# Patient Record
Sex: MALE | Race: WHITE | NOT HISPANIC OR LATINO
[De-identification: names, ages, dates, MRNs, and addresses within clinical notes are randomized per-mention and may not be internally consistent; named-entity substitution may affect disease eponyms.]

---

## 2017-09-27 ENCOUNTER — RESULT REVIEW (OUTPATIENT)
Age: 52
End: 2017-09-27

## 2018-08-28 ENCOUNTER — OUTPATIENT (OUTPATIENT)
Dept: OUTPATIENT SERVICES | Facility: HOSPITAL | Age: 53
LOS: 1 days | Discharge: HOME | End: 2018-08-28
Payer: COMMERCIAL

## 2018-08-28 VITALS
TEMPERATURE: 98 F | OXYGEN SATURATION: 98 % | RESPIRATION RATE: 14 BRPM | HEIGHT: 69.5 IN | HEART RATE: 88 BPM | WEIGHT: 207.01 LBS

## 2018-08-28 DIAGNOSIS — Z98.890 OTHER SPECIFIED POSTPROCEDURAL STATES: Chronic | ICD-10-CM

## 2018-08-28 DIAGNOSIS — K64.8 OTHER HEMORRHOIDS: ICD-10-CM

## 2018-08-28 DIAGNOSIS — K21.9 GASTRO-ESOPHAGEAL REFLUX DISEASE WITHOUT ESOPHAGITIS: ICD-10-CM

## 2018-08-28 DIAGNOSIS — I10 ESSENTIAL (PRIMARY) HYPERTENSION: ICD-10-CM

## 2018-08-28 DIAGNOSIS — Z01.818 ENCOUNTER FOR OTHER PREPROCEDURAL EXAMINATION: ICD-10-CM

## 2018-08-28 DIAGNOSIS — E78.00 PURE HYPERCHOLESTEROLEMIA, UNSPECIFIED: ICD-10-CM

## 2018-08-28 DIAGNOSIS — Z90.89 ACQUIRED ABSENCE OF OTHER ORGANS: Chronic | ICD-10-CM

## 2018-08-28 DIAGNOSIS — G47.33 OBSTRUCTIVE SLEEP APNEA (ADULT) (PEDIATRIC): ICD-10-CM

## 2018-08-28 DIAGNOSIS — F41.9 ANXIETY DISORDER, UNSPECIFIED: ICD-10-CM

## 2018-08-28 DIAGNOSIS — K62.5 HEMORRHAGE OF ANUS AND RECTUM: ICD-10-CM

## 2018-08-28 LAB
ANION GAP SERPL CALC-SCNC: 10 MMOL/L — SIGNIFICANT CHANGE UP (ref 5–17)
BASOPHILS # BLD AUTO: 0.07 K/UL — SIGNIFICANT CHANGE UP (ref 0–0.2)
BASOPHILS NFR BLD AUTO: 1 % — SIGNIFICANT CHANGE UP (ref 0–2)
BUN SERPL-MCNC: 20 MG/DL — SIGNIFICANT CHANGE UP (ref 7–23)
CALCIUM SERPL-MCNC: 8.8 MG/DL — SIGNIFICANT CHANGE UP (ref 8.5–10.1)
CHLORIDE SERPL-SCNC: 105 MMOL/L — SIGNIFICANT CHANGE UP (ref 96–108)
CO2 SERPL-SCNC: 26 MMOL/L — SIGNIFICANT CHANGE UP (ref 22–31)
CREAT SERPL-MCNC: 0.93 MG/DL — SIGNIFICANT CHANGE UP (ref 0.5–1.3)
EOSINOPHIL # BLD AUTO: 0.11 K/UL — SIGNIFICANT CHANGE UP (ref 0–0.5)
EOSINOPHIL NFR BLD AUTO: 1.5 % — SIGNIFICANT CHANGE UP (ref 0–6)
GLUCOSE SERPL-MCNC: 98 MG/DL — SIGNIFICANT CHANGE UP (ref 70–99)
HCT VFR BLD CALC: 46 % — SIGNIFICANT CHANGE UP (ref 39–50)
HGB BLD-MCNC: 15.5 G/DL — SIGNIFICANT CHANGE UP (ref 13–17)
IMM GRANULOCYTES NFR BLD AUTO: 0.3 % — SIGNIFICANT CHANGE UP (ref 0–1.5)
LYMPHOCYTES # BLD AUTO: 2.69 K/UL — SIGNIFICANT CHANGE UP (ref 1–3.3)
LYMPHOCYTES # BLD AUTO: 37 % — SIGNIFICANT CHANGE UP (ref 13–44)
MCHC RBC-ENTMCNC: 26.4 PG — LOW (ref 27–34)
MCHC RBC-ENTMCNC: 33.7 GM/DL — SIGNIFICANT CHANGE UP (ref 32–36)
MCV RBC AUTO: 78.2 FL — LOW (ref 80–100)
MONOCYTES # BLD AUTO: 0.56 K/UL — SIGNIFICANT CHANGE UP (ref 0–0.9)
MONOCYTES NFR BLD AUTO: 7.7 % — SIGNIFICANT CHANGE UP (ref 2–14)
NEUTROPHILS # BLD AUTO: 3.82 K/UL — SIGNIFICANT CHANGE UP (ref 1.8–7.4)
NEUTROPHILS NFR BLD AUTO: 52.5 % — SIGNIFICANT CHANGE UP (ref 43–77)
PLATELET # BLD AUTO: 250 K/UL — SIGNIFICANT CHANGE UP (ref 150–400)
POTASSIUM SERPL-MCNC: 3.6 MMOL/L — SIGNIFICANT CHANGE UP (ref 3.5–5.3)
POTASSIUM SERPL-SCNC: 3.6 MMOL/L — SIGNIFICANT CHANGE UP (ref 3.5–5.3)
RBC # BLD: 5.88 M/UL — HIGH (ref 4.2–5.8)
RBC # FLD: 13 % — SIGNIFICANT CHANGE UP (ref 10.3–14.5)
SODIUM SERPL-SCNC: 141 MMOL/L — SIGNIFICANT CHANGE UP (ref 135–145)
WBC # BLD: 7.27 K/UL — SIGNIFICANT CHANGE UP (ref 3.8–10.5)
WBC # FLD AUTO: 7.27 K/UL — SIGNIFICANT CHANGE UP (ref 3.8–10.5)

## 2018-08-28 PROCEDURE — 93010 ELECTROCARDIOGRAM REPORT: CPT

## 2018-08-28 RX ORDER — SIMVASTATIN 20 MG/1
0 TABLET, FILM COATED ORAL
Qty: 0 | Refills: 0 | COMMUNITY

## 2018-08-28 NOTE — H&P PST ADULT - FAMILY HISTORY
Father  Still living? No  Family history of prostate cancer, Age at diagnosis: Age Unknown     Mother  Still living? Yes, Estimated age: 81-90  Family history of CHF (congestive heart failure), Age at diagnosis: Age Unknown

## 2018-08-28 NOTE — H&P PST ADULT - ASSESSMENT
yo scheduled for   with    on     1. Labs as per surgeon  2. EKG  3. Medical clearance with  4. discussed EZ sponges & day of procedure instructions 54 yo male scheduled for  internal hemorrhoidectomy on 9/7/18 with Dr. Abreu    1. Labs as per surgeon  2. EKG  3. Medical clearance with PCP Dr Rubio Wadsworth to be arranged  4. discussed NPO & day of procedure instructions  5. Instructed to bring CPAP machine on day of procedure, verbalized understanding

## 2018-08-28 NOTE — H&P PST ADULT - PMH
Anxiety disorder    Essential hypertension    GERD (gastroesophageal reflux disease)    Hemorrhoids    Pure hypercholesterolemia    Sleep apnea  uses CPAP machine

## 2018-08-28 NOTE — H&P PST ADULT - PSH
H/O hemorrhoidectomy  2013  H/O right inguinal hernia repair  as a child  History of esophagogastroduodenoscopy (EGD)  2017  S/P colonoscopy    S/P tonsillectomy  as a child

## 2018-08-28 NOTE — H&P PST ADULT - HISTORY OF PRESENT ILLNESS
54 yo male presents to PST. Reports bleeding hemorrhoids. Reports daily BMs. Denies abdominal, n/v, rectal pain or anemia.

## 2018-09-07 ENCOUNTER — RESULT REVIEW (OUTPATIENT)
Age: 53
End: 2018-09-07

## 2018-09-07 ENCOUNTER — OUTPATIENT (OUTPATIENT)
Dept: OUTPATIENT SERVICES | Facility: HOSPITAL | Age: 53
LOS: 1 days | Discharge: HOME | End: 2018-09-07
Payer: COMMERCIAL

## 2018-09-07 VITALS
RESPIRATION RATE: 16 BRPM | DIASTOLIC BLOOD PRESSURE: 85 MMHG | OXYGEN SATURATION: 98 % | TEMPERATURE: 98 F | HEIGHT: 70 IN | WEIGHT: 207.01 LBS | HEART RATE: 74 BPM | SYSTOLIC BLOOD PRESSURE: 145 MMHG

## 2018-09-07 VITALS
HEART RATE: 71 BPM | OXYGEN SATURATION: 97 % | RESPIRATION RATE: 16 BRPM | DIASTOLIC BLOOD PRESSURE: 73 MMHG | SYSTOLIC BLOOD PRESSURE: 146 MMHG

## 2018-09-07 DIAGNOSIS — Z90.89 ACQUIRED ABSENCE OF OTHER ORGANS: Chronic | ICD-10-CM

## 2018-09-07 DIAGNOSIS — Z98.890 OTHER SPECIFIED POSTPROCEDURAL STATES: Chronic | ICD-10-CM

## 2018-09-07 PROCEDURE — 88304 TISSUE EXAM BY PATHOLOGIST: CPT | Mod: 26

## 2018-09-07 RX ORDER — OXYCODONE HYDROCHLORIDE 5 MG/1
5 TABLET ORAL ONCE
Qty: 0 | Refills: 0 | Status: DISCONTINUED | OUTPATIENT
Start: 2018-09-07 | End: 2018-09-07

## 2018-09-07 RX ORDER — OMEGA-3 ACID ETHYL ESTERS 1 G
1 CAPSULE ORAL
Qty: 0 | Refills: 0 | COMMUNITY

## 2018-09-07 RX ORDER — SODIUM CHLORIDE 9 MG/ML
1000 INJECTION, SOLUTION INTRAVENOUS
Qty: 0 | Refills: 0 | Status: DISCONTINUED | OUTPATIENT
Start: 2018-09-07 | End: 2018-09-07

## 2018-09-07 RX ORDER — ATORVASTATIN CALCIUM 80 MG/1
1 TABLET, FILM COATED ORAL
Qty: 0 | Refills: 0 | COMMUNITY

## 2018-09-07 RX ORDER — OXYCODONE HYDROCHLORIDE 5 MG/1
10 TABLET ORAL ONCE
Qty: 0 | Refills: 0 | Status: DISCONTINUED | OUTPATIENT
Start: 2018-09-07 | End: 2018-09-07

## 2018-09-07 RX ORDER — OXYCODONE HYDROCHLORIDE 5 MG/1
2 TABLET ORAL
Qty: 40 | Refills: 0 | OUTPATIENT
Start: 2018-09-07 | End: 2018-09-11

## 2018-09-07 RX ORDER — MULTIVIT-MIN/FERROUS GLUCONATE 9 MG/15 ML
1 LIQUID (ML) ORAL
Qty: 0 | Refills: 0 | COMMUNITY

## 2018-09-07 RX ORDER — ONDANSETRON 8 MG/1
4 TABLET, FILM COATED ORAL ONCE
Qty: 0 | Refills: 0 | Status: DISCONTINUED | OUTPATIENT
Start: 2018-09-07 | End: 2018-09-07

## 2018-09-07 RX ORDER — FENTANYL CITRATE 50 UG/ML
50 INJECTION INTRAVENOUS
Qty: 0 | Refills: 0 | Status: DISCONTINUED | OUTPATIENT
Start: 2018-09-07 | End: 2018-09-07

## 2018-09-07 RX ORDER — OMEPRAZOLE 10 MG/1
1 CAPSULE, DELAYED RELEASE ORAL
Qty: 0 | Refills: 0 | COMMUNITY

## 2018-09-07 NOTE — ASU DISCHARGE PLAN (ADULT/PEDIATRIC). - NOTIFY
Bleeding that does not stop/Pain not relieved by Medications/Fever greater than 101/Unable to Urinate/Inability to Tolerate Liquids or Foods/Persistent Nausea and Vomiting

## 2018-09-07 NOTE — ASU DISCHARGE PLAN (ADULT/PEDIATRIC). - SPECIAL INSTRUCTIONS
If no BM by Sunday 10 am take 2 dulcolax pills by mouth.  If unable to urinate, soak in warm tub and urinate in the tub. Take Ibuprofen 600 mg three times a day with food.  If no BM by Sunday 10 am take 2 dulcolax pills by mouth.  If unable to urinate, soak in warm tub and urinate in the tub.

## 2018-09-07 NOTE — ASU DISCHARGE PLAN (ADULT/PEDIATRIC). - INSTRUCTIONS
Regular diet.  Take metamucil or citrucel fiber one serving twice a day.  Drink 48 oz of noncaffeinated liquid / day if voiding without difficulty.

## 2018-09-07 NOTE — BRIEF OPERATIVE NOTE - PROCEDURE
<<-----Click on this checkbox to enter Procedure Hemorrhoid excisions  09/07/2018    Active  [facility]  Hemorrhoidectomy by ligation  09/07/2018    Active  [facility]

## 2018-09-07 NOTE — ASU DISCHARGE PLAN (ADULT/PEDIATRIC). - NURSING INSTRUCTIONS
Minimize IVF. Minimize IVF.  Begin with liquids and light food ( tea, toast, Jello, soups). Advance to what you normally eat. Liquids should taken in adequate amounts today.

## 2018-09-11 DIAGNOSIS — E78.00 PURE HYPERCHOLESTEROLEMIA, UNSPECIFIED: ICD-10-CM

## 2018-09-11 DIAGNOSIS — K64.8 OTHER HEMORRHOIDS: ICD-10-CM

## 2018-09-11 DIAGNOSIS — K62.5 HEMORRHAGE OF ANUS AND RECTUM: ICD-10-CM

## 2018-09-11 DIAGNOSIS — F41.9 ANXIETY DISORDER, UNSPECIFIED: ICD-10-CM

## 2018-09-11 DIAGNOSIS — G47.33 OBSTRUCTIVE SLEEP APNEA (ADULT) (PEDIATRIC): ICD-10-CM

## 2018-09-11 DIAGNOSIS — K21.9 GASTRO-ESOPHAGEAL REFLUX DISEASE WITHOUT ESOPHAGITIS: ICD-10-CM

## 2018-09-11 DIAGNOSIS — F17.210 NICOTINE DEPENDENCE, CIGARETTES, UNCOMPLICATED: ICD-10-CM

## 2018-09-11 DIAGNOSIS — I10 ESSENTIAL (PRIMARY) HYPERTENSION: ICD-10-CM

## 2018-09-11 LAB — SURGICAL PATHOLOGY FINAL REPORT - CH: SIGNIFICANT CHANGE UP

## 2019-06-18 PROBLEM — Z00.00 ENCOUNTER FOR PREVENTIVE HEALTH EXAMINATION: Status: ACTIVE | Noted: 2019-06-18

## 2019-06-18 PROBLEM — F41.9 ANXIETY DISORDER, UNSPECIFIED: Chronic | Status: ACTIVE | Noted: 2018-08-28

## 2019-06-18 PROBLEM — K64.9 UNSPECIFIED HEMORRHOIDS: Chronic | Status: ACTIVE | Noted: 2018-08-28

## 2019-06-18 PROBLEM — E78.00 PURE HYPERCHOLESTEROLEMIA, UNSPECIFIED: Chronic | Status: ACTIVE | Noted: 2018-08-28

## 2019-06-18 PROBLEM — G47.30 SLEEP APNEA, UNSPECIFIED: Chronic | Status: ACTIVE | Noted: 2018-08-28

## 2019-06-18 PROBLEM — K21.9 GASTRO-ESOPHAGEAL REFLUX DISEASE WITHOUT ESOPHAGITIS: Chronic | Status: ACTIVE | Noted: 2018-08-28

## 2019-06-18 PROBLEM — I10 ESSENTIAL (PRIMARY) HYPERTENSION: Chronic | Status: ACTIVE | Noted: 2018-08-28

## 2019-06-19 ENCOUNTER — APPOINTMENT (OUTPATIENT)
Dept: ORTHOPEDIC SURGERY | Facility: CLINIC | Age: 54
End: 2019-06-19
Payer: COMMERCIAL

## 2019-06-19 VITALS
SYSTOLIC BLOOD PRESSURE: 136 MMHG | DIASTOLIC BLOOD PRESSURE: 81 MMHG | HEART RATE: 89 BPM | TEMPERATURE: 98 F | HEIGHT: 70 IN | WEIGHT: 205 LBS | BODY MASS INDEX: 29.35 KG/M2

## 2019-06-19 DIAGNOSIS — Z86.39 PERSONAL HISTORY OF OTHER ENDOCRINE, NUTRITIONAL AND METABOLIC DISEASE: ICD-10-CM

## 2019-06-19 DIAGNOSIS — Z80.9 FAMILY HISTORY OF MALIGNANT NEOPLASM, UNSPECIFIED: ICD-10-CM

## 2019-06-19 PROCEDURE — 99203 OFFICE O/P NEW LOW 30 MIN: CPT

## 2019-06-19 PROCEDURE — 73120 X-RAY EXAM OF HAND: CPT | Mod: LT

## 2019-06-27 NOTE — DISCUSSION/SUMMARY
[de-identified] : At this time, due to trigger finger of left thumb, I recommended splinting, ice and an anti-inflammatory.  He will be reassessed in two to three weeks for possible injection.

## 2019-06-27 NOTE — ADDENDUM
[FreeTextEntry1] : This note was written by Jojo Lee on 06/27/2019 acting as a scribe for PADMINI MARCELINO

## 2019-06-27 NOTE — HISTORY OF PRESENT ILLNESS
[3] : the ailment interference is 3/10 [(Does not interfere) 0] : the ailment interference is 0/10 (does not interfere) [de-identified] : The patient comes in today with complaints of pain to his left thumb. He states it has been going on since 5/1/19.  He noticed popping and catching.  This injury is not due to an automobile accident.  The patient states the pain is constant.  The patient describes the pain as sharp.  The patient notes nothing makes his symptoms better, while bending makes his symptoms worse.   The patient indicates pain is at a level of 5 on a pain scale of 0-10. [] : No

## 2019-06-27 NOTE — PHYSICAL EXAM
[Normal] : Gait: normal [de-identified] : Right Thumb:\par \par                                    					           Claimant:  	   Normal:  \par \par Thumb Interphalangeal Hyperextension/Flexion		                           15H/80             15H/80\par \par Thumb Metacarpophalangeal Hyperextension/Flexion	                           10/55	    10/55\par \par Finger DIP Joints Extension/Flexion				             0/80	     0/80\par \par Finger PIP Joints Extension/Flexion 				             0/100	     0/100\par \par Finger MCP Joints Hyperextension/Flexion 			      (0-45H)/90	     (0-45H)/90\par \par FDS, FDP intact.  No triggering.  No tenderness or swelling over the A1 pulley for each finger.  No instability to varus or valgus stress.  No motor or sensory deficits.   Less than two second capillary refill.  Skin is intact.  No rashes, scars or lesions.\par \par Left Thumb:\par                                                                                                 Claimant:  	 Normal:  \par \par Thumb Interphalangeal Hyperextension/Flexion		15H/80		15H/80\par \par Thumb Metacarpophalangeal Hyperextension/Flexion	10/55		10/55\par \par Finger DIP Joints Extension/Flexion		                 0/80		0/80\par \par Finger PIP Joints Extension/Flexion 			0/100		0/100\par \par Finger MCP Joints Hyperextension/Flexion 		(0-45H)/90	(0-45H)/90\par \par \par FDS, FDP intact.  Positive triggering.  Positive tenderness at the A1 pulley.  No instability to varus or valgus stress.  No gross neurologic or vascular deficits distally.  Less than two second capillary refill.  Skin is intact.  No rashes, scars or lesions. \par   [de-identified] : Appearance:  Well-developed, well-nourished male in no acute distress.\par \par   [de-identified] : Radiographs, two views of the left hand, show no obvious osseous abnormalities.

## 2019-07-12 ENCOUNTER — APPOINTMENT (OUTPATIENT)
Dept: ORTHOPEDIC SURGERY | Facility: CLINIC | Age: 54
End: 2019-07-12
Payer: COMMERCIAL

## 2019-07-12 VITALS
HEIGHT: 70 IN | WEIGHT: 205 LBS | TEMPERATURE: 98.2 F | SYSTOLIC BLOOD PRESSURE: 142 MMHG | HEART RATE: 99 BPM | DIASTOLIC BLOOD PRESSURE: 94 MMHG | BODY MASS INDEX: 29.35 KG/M2

## 2019-07-12 DIAGNOSIS — M65.312 TRIGGER THUMB, LEFT THUMB: ICD-10-CM

## 2019-07-12 PROCEDURE — 99212 OFFICE O/P EST SF 10 MIN: CPT

## 2019-07-16 NOTE — HISTORY OF PRESENT ILLNESS
[de-identified] : The patient comes in today status post a left thumb trigger finger.  He states he had an injection and he feels much better from the injection.  He states intermittently it will trigger, but otherwise he is 75% better.

## 2019-07-16 NOTE — ADDENDUM
[FreeTextEntry1] : This note was written by Angelina Villaseñor on 07/16/2019 acting as scribe for Kady Skelton, OTR/L, PA

## 2019-07-16 NOTE — PHYSICAL EXAM
[Normal] : Gait: normal [de-identified] : Left Thumb:\par       Claimant: 	 Normal: \par \par Thumb Interphalangeal Hyperextension/Flexion		15H/80		15H/80\par \par Thumb Metacarpophalangeal Hyperextension/Flexion	10/55		10/55\par \par Finger DIP Joints Extension/Flexion		  0/80		0/80\par \par Finger PIP Joints Extension/Flexion 			0/100		0/100\par \par Finger MCP Joints Hyperextension/Flexion 		(0-45H)/90	(0-45H)/90\par \par \par FDS, FDP intact. Positive triggering. Positive tenderness at the A1 pulley. No instability to varus or valgus stress. No gross neurologic or vascular deficits distally. Less than two second capillary refill. Skin is intact. No rashes, scars or lesions. \par  [de-identified] : Appearance:  Well-developed, well-nourished male in no acute distress.\par

## 2019-07-16 NOTE — DISCUSSION/SUMMARY
[de-identified] : The patient is status post trigger finger of the left thumb.  The patient is advised to return to the office on an as needed basis.\par \par The patient has been advised that their blood pressure today was outside normal ranges and the patient was instructed accordingly. Our Blood Pressure Monitoring Sheet with instructions was given to the patient.\par

## 2020-12-29 ENCOUNTER — APPOINTMENT (OUTPATIENT)
Dept: RADIOLOGY | Facility: CLINIC | Age: 55
End: 2020-12-29
Payer: COMMERCIAL

## 2020-12-29 ENCOUNTER — OUTPATIENT (OUTPATIENT)
Dept: OUTPATIENT SERVICES | Facility: HOSPITAL | Age: 55
LOS: 1 days | End: 2020-12-29
Payer: COMMERCIAL

## 2020-12-29 DIAGNOSIS — Z98.890 OTHER SPECIFIED POSTPROCEDURAL STATES: Chronic | ICD-10-CM

## 2020-12-29 DIAGNOSIS — Z00.00 ENCOUNTER FOR GENERAL ADULT MEDICAL EXAMINATION WITHOUT ABNORMAL FINDINGS: ICD-10-CM

## 2020-12-29 DIAGNOSIS — Z90.89 ACQUIRED ABSENCE OF OTHER ORGANS: Chronic | ICD-10-CM

## 2020-12-29 PROCEDURE — 73030 X-RAY EXAM OF SHOULDER: CPT

## 2020-12-29 PROCEDURE — 73620 X-RAY EXAM OF FOOT: CPT

## 2020-12-29 PROCEDURE — 73030 X-RAY EXAM OF SHOULDER: CPT | Mod: 26,LT

## 2020-12-29 PROCEDURE — 73620 X-RAY EXAM OF FOOT: CPT | Mod: 26,LT

## 2022-06-13 ENCOUNTER — APPOINTMENT (OUTPATIENT)
Dept: ORTHOPEDIC SURGERY | Facility: CLINIC | Age: 57
End: 2022-06-13
Payer: COMMERCIAL

## 2022-06-13 PROCEDURE — 20550 NJX 1 TENDON SHEATH/LIGAMENT: CPT | Mod: RT

## 2022-06-14 NOTE — DISCUSSION/SUMMARY
[de-identified] : At this time, due to trigger finger of the right thumb, I recommend ice and elevation. He will be reassessed in three to four weeks.

## 2022-06-14 NOTE — HISTORY OF PRESENT ILLNESS
[de-identified] : The patient comes in today with complaints of catching and popping of the right thumb.  He was seen in the past for a trigger finger on the left, which has fully resolved.

## 2022-06-14 NOTE — PHYSICAL EXAM
[de-identified] : Right Thumb:\par                                                                                                 Claimant:  	 Normal:  \par \par Thumb Interphalangeal Hyperextension/Flexion		15H/80		15H/80\par \par Thumb Metacarpophalangeal Hyperextension/Flexion	10/55		10/55\par \par Finger DIP Joints Extension/Flexion		                 0/80		0/80\par \par Finger PIP Joints Extension/Flexion 			0/100		0/100\par \par Finger MCP Joints Hyperextension/Flexion 		(0-45H)/90	(0-45H)/90\par \par \par FDS, FDP intact.  Positive triggering.  Positive tenderness at the A1 pulley.  No instability to varus or valgus stress.  No gross neurologic or vascular deficits distally.  Less than two second capillary refill.  Skin is intact.  No rashes, scars or lesions.\par  \par Left Thumb:\par \par                                    					           Claimant:  	   Normal:  \par \par Thumb Interphalangeal Hyperextension/Flexion		                           15H/80             15H/80\par \par Thumb Metacarpophalangeal Hyperextension/Flexion	                           10/55	    10/55\par \par Finger DIP Joints Extension/Flexion				             0/80	     0/80\par \par Finger PIP Joints Extension/Flexion 				             0/100	     0/100\par \par Finger MCP Joints Hyperextension/Flexion 			      (0-45H)/90	     (0-45H)/90\par \par FDS, FDP intact.  No triggering.  No tenderness or swelling over the A1 pulley for each finger.  No instability to varus or valgus stress.  No motor or sensory deficits.   Less than two second capillary refill.  Skin is intact.  No rashes, scars or lesions.\par   [de-identified] : Ambulating with a normal gait. Station:  Normal.  [de-identified] : Appearance:  Well-developed, well-nourished male in no acute distress.\par

## 2022-06-14 NOTE — PROCEDURE
[de-identified] : Indication:  Trigger Finger of Right Thumb\par \par Consent: \par At this time, I have recommended an injection to the right thumb.  The risks and benefits of the procedure were discussed with the patient in detail.  Upon verbal consent of the patient, we proceeded with the injection as noted below.  \par \par Procedure:  \par After a sterile prep, the patient underwent an injection of 9 cc of 1% Lidocaine without epinephrine and 1 cc of Kenalog (40mg/mL) into the right thumb.  The patient tolerated the procedure well.  There were no complications.  \par \par : [employer]par Drug name:     Triamcinolone Acetonide Injectable Suspension USP\par NDC #:            2531-7410-40\par Lot #:              [id number]\par Expiration:      09/23

## 2022-06-14 NOTE — ADDENDUM
[FreeTextEntry1] : This note was written by Jojo Lee on 06/14/2022 acting as a scribe for PADMINI MARCELINO III, MD

## 2022-06-23 ENCOUNTER — APPOINTMENT (OUTPATIENT)
Dept: ORTHOPEDIC SURGERY | Facility: CLINIC | Age: 57
End: 2022-06-23

## 2022-06-23 PROCEDURE — 99441: CPT

## 2022-06-30 ENCOUNTER — APPOINTMENT (OUTPATIENT)
Dept: ORTHOPEDIC SURGERY | Facility: CLINIC | Age: 57
End: 2022-06-30

## 2022-06-30 DIAGNOSIS — M65.311 TRIGGER THUMB, RIGHT THUMB: ICD-10-CM

## 2022-06-30 PROCEDURE — 99441: CPT

## 2023-04-25 ENCOUNTER — OFFICE (OUTPATIENT)
Dept: URBAN - METROPOLITAN AREA CLINIC 12 | Facility: CLINIC | Age: 58
Setting detail: OPHTHALMOLOGY
End: 2023-04-25
Payer: COMMERCIAL

## 2023-04-25 DIAGNOSIS — H40.1131: ICD-10-CM

## 2023-04-25 DIAGNOSIS — H43.393: ICD-10-CM

## 2023-04-25 DIAGNOSIS — H01.004: ICD-10-CM

## 2023-04-25 DIAGNOSIS — H01.001: ICD-10-CM

## 2023-04-25 PROCEDURE — 92004 COMPRE OPH EXAM NEW PT 1/>: CPT | Performed by: OPHTHALMOLOGY

## 2023-04-25 PROCEDURE — 92133 CPTRZD OPH DX IMG PST SGM ON: CPT | Performed by: OPHTHALMOLOGY

## 2023-04-25 PROCEDURE — 92083 EXTENDED VISUAL FIELD XM: CPT | Performed by: OPHTHALMOLOGY

## 2023-04-25 ASSESSMENT — REFRACTION_CURRENTRX
OS_AXIS: 175
OD_AXIS: 178
OD_OVR_VA: 20/
OD_VPRISM_DIRECTION: SV
OS_OVR_VA: 20/
OS_CYLINDER: -1.75
OD_CYLINDER: -1.75
OS_VPRISM_DIRECTION: SV
OD_SPHERE: -4.25
OS_SPHERE: -4.25

## 2023-04-25 ASSESSMENT — KERATOMETRY
OD_K1POWER_DIOPTERS: 43.75
OD_K2POWER_DIOPTERS: 45.75
OS_AXISANGLE_DEGREES: 089
OD_AXISANGLE_DEGREES: 093
OS_K1POWER_DIOPTERS: 43.50
OS_K2POWER_DIOPTERS: 45.50

## 2023-04-25 ASSESSMENT — SPHEQUIV_DERIVED
OD_SPHEQUIV: -5.5
OS_SPHEQUIV: -5.5

## 2023-04-25 ASSESSMENT — CONFRONTATIONAL VISUAL FIELD TEST (CVF)
OS_FINDINGS: FULL
OD_FINDINGS: FULL

## 2023-04-25 ASSESSMENT — AXIALLENGTH_DERIVED
OD_AL: 25.405
OS_AL: 25.5119

## 2023-04-25 ASSESSMENT — REFRACTION_AUTOREFRACTION
OS_SPHERE: -4.50
OD_AXIS: 008
OD_CYLINDER: -2.00
OD_SPHERE: -4.50
OS_AXIS: 175
OS_CYLINDER: -2.00

## 2023-04-25 ASSESSMENT — VISUAL ACUITY
OS_BCVA: 20/20
OD_BCVA: 20/20

## 2023-08-04 ENCOUNTER — OFFICE (OUTPATIENT)
Dept: URBAN - METROPOLITAN AREA CLINIC 12 | Facility: CLINIC | Age: 58
Setting detail: OPHTHALMOLOGY
End: 2023-08-04
Payer: COMMERCIAL

## 2023-08-04 DIAGNOSIS — H01.004: ICD-10-CM

## 2023-08-04 DIAGNOSIS — H43.393: ICD-10-CM

## 2023-08-04 DIAGNOSIS — H01.001: ICD-10-CM

## 2023-08-04 DIAGNOSIS — H40.1131: ICD-10-CM

## 2023-08-04 PROCEDURE — 92012 INTRM OPH EXAM EST PATIENT: CPT | Performed by: OPHTHALMOLOGY

## 2023-08-04 PROCEDURE — 76514 ECHO EXAM OF EYE THICKNESS: CPT | Performed by: OPHTHALMOLOGY

## 2023-08-04 PROCEDURE — 92250 FUNDUS PHOTOGRAPHY W/I&R: CPT | Performed by: OPHTHALMOLOGY

## 2023-08-04 ASSESSMENT — REFRACTION_CURRENTRX
OS_OVR_VA: 20/
OS_AXIS: 174
OS_SPHERE: -4.50
OD_CYLINDER: -2.00
OD_SPHERE: -4.50
OD_VPRISM_DIRECTION: SV
OS_VPRISM_DIRECTION: SV
OD_OVR_VA: 20/
OD_AXIS: 177
OS_CYLINDER: -1.50

## 2023-08-04 ASSESSMENT — CONFRONTATIONAL VISUAL FIELD TEST (CVF)
OD_FINDINGS: FULL
OS_FINDINGS: FULL

## 2023-08-04 ASSESSMENT — KERATOMETRY
OD_K1POWER_DIOPTERS: 44.00
OS_AXISANGLE_DEGREES: 087
OD_AXISANGLE_DEGREES: 102
OD_K2POWER_DIOPTERS: 45.50
OS_K2POWER_DIOPTERS: 45.50
OS_K1POWER_DIOPTERS: 44.00

## 2023-08-04 ASSESSMENT — VISUAL ACUITY
OD_BCVA: 20/20-1
OS_BCVA: 20/20-2

## 2023-08-04 ASSESSMENT — AXIALLENGTH_DERIVED
OS_AL: 25.2367
OD_AL: 25.2367

## 2023-08-04 ASSESSMENT — REFRACTION_AUTOREFRACTION
OD_AXIS: 012
OD_SPHERE: -4.25
OD_CYLINDER: -1.75
OS_SPHERE: -4.25
OS_AXIS: 174
OS_CYLINDER: -1.75

## 2023-08-04 ASSESSMENT — TONOMETRY
OS_IOP_MMHG: 19
OD_IOP_MMHG: 17

## 2023-08-04 ASSESSMENT — SPHEQUIV_DERIVED
OS_SPHEQUIV: -5.125
OD_SPHEQUIV: -5.125

## 2023-08-04 ASSESSMENT — PACHYMETRY
OD_CT_UM: 563
OS_CT_UM: 563
OS_CT_CORRECTION: -1
OD_CT_CORRECTION: -1

## 2024-02-16 ENCOUNTER — OFFICE (OUTPATIENT)
Dept: URBAN - METROPOLITAN AREA CLINIC 12 | Facility: CLINIC | Age: 59
Setting detail: OPHTHALMOLOGY
End: 2024-02-16
Payer: COMMERCIAL

## 2024-02-16 DIAGNOSIS — H43.393: ICD-10-CM

## 2024-02-16 DIAGNOSIS — H01.001: ICD-10-CM

## 2024-02-16 DIAGNOSIS — H16.223: ICD-10-CM

## 2024-02-16 DIAGNOSIS — H01.004: ICD-10-CM

## 2024-02-16 DIAGNOSIS — H40.1131: ICD-10-CM

## 2024-02-16 PROCEDURE — 92014 COMPRE OPH EXAM EST PT 1/>: CPT | Performed by: OPHTHALMOLOGY

## 2024-02-16 PROCEDURE — 92083 EXTENDED VISUAL FIELD XM: CPT | Performed by: OPHTHALMOLOGY

## 2024-02-16 PROCEDURE — 92133 CPTRZD OPH DX IMG PST SGM ON: CPT | Performed by: OPHTHALMOLOGY

## 2024-02-16 ASSESSMENT — CONFRONTATIONAL VISUAL FIELD TEST (CVF)
OS_FINDINGS: FULL
OD_FINDINGS: FULL

## 2024-02-16 ASSESSMENT — REFRACTION_AUTOREFRACTION
OD_CYLINDER: -2.00
OS_AXIS: 169
OS_CYLINDER: -2.00
OD_AXIS: 010
OS_SPHERE: -4.25
OD_SPHERE: -4.25

## 2024-02-16 ASSESSMENT — REFRACTION_CURRENTRX
OS_CYLINDER: -1.50
OS_OVR_VA: 20/
OS_VPRISM_DIRECTION: SV
OD_AXIS: 177
OD_VPRISM_DIRECTION: SV
OD_CYLINDER: -2.00
OD_OVR_VA: 20/
OS_SPHERE: -4.50
OS_AXIS: 174
OD_SPHERE: -4.50

## 2024-02-16 ASSESSMENT — SUPERFICIAL PUNCTATE KERATITIS (SPK)
OS_SPK: T
OD_SPK: T

## 2024-02-16 ASSESSMENT — SPHEQUIV_DERIVED
OD_SPHEQUIV: -5.25
OS_SPHEQUIV: -5.25

## 2024-08-23 ENCOUNTER — OFFICE (OUTPATIENT)
Dept: URBAN - METROPOLITAN AREA CLINIC 12 | Facility: CLINIC | Age: 59
Setting detail: OPHTHALMOLOGY
End: 2024-08-23
Payer: COMMERCIAL

## 2024-08-23 DIAGNOSIS — H40.1131: ICD-10-CM

## 2024-08-23 DIAGNOSIS — H43.393: ICD-10-CM

## 2024-08-23 DIAGNOSIS — H16.223: ICD-10-CM

## 2024-08-23 PROCEDURE — 92020 GONIOSCOPY: CPT | Performed by: OPHTHALMOLOGY

## 2024-08-23 PROCEDURE — 92012 INTRM OPH EXAM EST PATIENT: CPT | Performed by: OPHTHALMOLOGY

## 2024-08-23 PROCEDURE — 92250 FUNDUS PHOTOGRAPHY W/I&R: CPT | Performed by: OPHTHALMOLOGY

## 2024-08-23 ASSESSMENT — CONFRONTATIONAL VISUAL FIELD TEST (CVF)
OD_FINDINGS: FULL
OS_FINDINGS: FULL

## 2025-04-11 ENCOUNTER — OFFICE (OUTPATIENT)
Dept: URBAN - METROPOLITAN AREA CLINIC 12 | Facility: CLINIC | Age: 60
Setting detail: OPHTHALMOLOGY
End: 2025-04-11
Payer: COMMERCIAL

## 2025-04-11 DIAGNOSIS — H35.363: ICD-10-CM

## 2025-04-11 DIAGNOSIS — H16.223: ICD-10-CM

## 2025-04-11 DIAGNOSIS — H40.1131: ICD-10-CM

## 2025-04-11 DIAGNOSIS — H43.393: ICD-10-CM

## 2025-04-11 PROCEDURE — 92014 COMPRE OPH EXAM EST PT 1/>: CPT | Performed by: OPHTHALMOLOGY

## 2025-04-11 PROCEDURE — 92134 CPTRZ OPH DX IMG PST SGM RTA: CPT | Performed by: OPHTHALMOLOGY

## 2025-04-11 ASSESSMENT — CONFRONTATIONAL VISUAL FIELD TEST (CVF)
OS_FINDINGS: FULL
OD_FINDINGS: FULL

## 2025-04-11 ASSESSMENT — SUPERFICIAL PUNCTATE KERATITIS (SPK)
OS_SPK: T
OD_SPK: T

## 2025-04-11 ASSESSMENT — PACHYMETRY
OS_CT_UM: 563
OD_CT_CORRECTION: -1
OS_CT_CORRECTION: -1
OD_CT_UM: 563

## 2025-04-11 ASSESSMENT — TONOMETRY
OD_IOP_MMHG: 15
OS_IOP_MMHG: 15

## 2025-04-12 ASSESSMENT — VISUAL ACUITY
OS_BCVA: 20/25+2
OD_BCVA: 20/20-2

## 2025-04-12 ASSESSMENT — KERATOMETRY
OS_K2POWER_DIOPTERS: 45.50
OS_K1POWER_DIOPTERS: 44.00
METHOD_AUTO_MANUAL: AUTO
OD_AXISANGLE_DEGREES: 098
OD_K1POWER_DIOPTERS: 44.25
OS_AXISANGLE_DEGREES: 085
OD_K2POWER_DIOPTERS: 45.75

## 2025-04-12 ASSESSMENT — REFRACTION_CURRENTRX
OD_OVR_VA: 20/
OS_VPRISM_DIRECTION: SV
OD_AXIS: 001
OD_CYLINDER: -1.75
OS_OVR_VA: 20/
OD_SPHERE: -4.25
OS_AXIS: 176
OD_VPRISM_DIRECTION: SV
OS_CYLINDER: -1.75
OS_SPHERE: -4.25

## 2025-04-12 ASSESSMENT — REFRACTION_AUTOREFRACTION
OD_AXIS: 008
OS_AXIS: 173
OD_SPHERE: -4.00
OS_SPHERE: PLANO
OD_CYLINDER: -2.00
OS_CYLINDER: -2.00